# Patient Record
Sex: FEMALE | Race: BLACK OR AFRICAN AMERICAN | NOT HISPANIC OR LATINO | URBAN - METROPOLITAN AREA
[De-identification: names, ages, dates, MRNs, and addresses within clinical notes are randomized per-mention and may not be internally consistent; named-entity substitution may affect disease eponyms.]

---

## 2024-05-02 ENCOUNTER — OFFICE VISIT (OUTPATIENT)
Dept: URBAN - METROPOLITAN AREA CLINIC 70 | Facility: CLINIC | Age: 69
End: 2024-05-02
Payer: MEDICARE

## 2024-05-02 ENCOUNTER — LAB OUTSIDE AN ENCOUNTER (OUTPATIENT)
Dept: URBAN - METROPOLITAN AREA CLINIC 70 | Facility: CLINIC | Age: 69
End: 2024-05-02

## 2024-05-02 VITALS
WEIGHT: 209.8 LBS | TEMPERATURE: 97.3 F | DIASTOLIC BLOOD PRESSURE: 69 MMHG | BODY MASS INDEX: 32.93 KG/M2 | HEART RATE: 64 BPM | SYSTOLIC BLOOD PRESSURE: 103 MMHG | HEIGHT: 67 IN

## 2024-05-02 DIAGNOSIS — R79.89 ELEVATED LFTS: ICD-10-CM

## 2024-05-02 DIAGNOSIS — Z12.11 COLON CANCER SCREENING: ICD-10-CM

## 2024-05-02 DIAGNOSIS — K21.9 GASTROESOPHAGEAL REFLUX DISEASE, UNSPECIFIED WHETHER ESOPHAGITIS PRESENT: ICD-10-CM

## 2024-05-02 DIAGNOSIS — K59.04 CHRONIC IDIOPATHIC CONSTIPATION: ICD-10-CM

## 2024-05-02 PROBLEM — 82934008: Status: ACTIVE | Noted: 2024-05-02

## 2024-05-02 LAB — RAM: (no result)

## 2024-05-02 PROCEDURE — 99205 OFFICE O/P NEW HI 60 MIN: CPT | Performed by: REGISTERED NURSE

## 2024-05-02 RX ORDER — HYDROCODONE BITARTRATE AND ACETAMINOPHEN 10; 325 MG/1; MG/1
TAKE 1 TABLET BY ORAL ROUTE EVERY 4-6 HOURS AS NEEDED FOR PAIN TABLET ORAL
Qty: 0 | Refills: 0 | Status: ACTIVE | COMMUNITY
Start: 1900-01-01

## 2024-05-02 RX ORDER — AZELASTINE 137 UG/1
SPRAY 2 SPRAYS IN EACH NOSTRIL BY INTRANASAL ROUTE 2 TIMES PER DAY SPRAY, METERED NASAL 2
Qty: 1 | Refills: 0 | Status: DISCONTINUED | COMMUNITY
Start: 1900-01-01

## 2024-05-02 RX ORDER — ALBUTEROL SULFATE 90 UG/1
AEROSOL, METERED RESPIRATORY (INHALATION)
Qty: 18 GRAM | Status: ACTIVE | COMMUNITY

## 2024-05-02 RX ORDER — DEXTROSE 4 G
TAKE 1 TABLET (10 MG) BY ORAL ROUTE ONCE DAILY TABLET,CHEWABLE ORAL 1
Qty: 0 | Refills: 0 | Status: DISCONTINUED | COMMUNITY
Start: 1900-01-01

## 2024-05-02 RX ORDER — ATORVASTATIN CALCIUM 40 MG/1
TABLET, FILM COATED ORAL
Qty: 0 | Refills: 0 | Status: ACTIVE | COMMUNITY
Start: 1900-01-01

## 2024-05-02 RX ORDER — MONTELUKAST 10 MG/1
TAKE 1 TABLET BY MOUTH ONCE EVERY DAY TABLET, FILM COATED ORAL
Qty: 30 EACH | Refills: 1 | Status: ACTIVE | COMMUNITY

## 2024-05-02 RX ORDER — LEVOCETIRIZINE DIHYDROCHLORIDE 5 MG/1
TAKE 1 TABLET BY MOUTH ONCE EVERY DAY TABLET ORAL
Qty: 30 EACH | Refills: 0 | Status: ACTIVE | COMMUNITY

## 2024-05-02 RX ORDER — PANTOPRAZOLE SODIUM 40 MG/1
1 TABLET TABLET, DELAYED RELEASE ORAL ONCE A DAY
Qty: 90 TABLET | Refills: 3 | OUTPATIENT
Start: 2024-05-02

## 2024-05-02 RX ORDER — METOPROLOL TARTRATE 25 MG/1
TABLET, FILM COATED ORAL
Qty: 0 | Refills: 0 | Status: ACTIVE | COMMUNITY
Start: 1900-01-01

## 2024-05-02 RX ORDER — ALBUTEROL SULFATE 108 UG/1
INHALE 1 PUFF (90 MCG) BY INHALATION ROUTE EVERY 6 HOURS AS NEEDED AEROSOL, METERED RESPIRATORY (INHALATION)
Qty: 1 | Refills: 0 | Status: DISCONTINUED | COMMUNITY
Start: 1900-01-01

## 2024-05-02 RX ORDER — FUROSEMIDE 40 MG/1
TABLET ORAL
Qty: 0 | Refills: 0 | Status: DISCONTINUED | COMMUNITY
Start: 1900-01-01

## 2024-05-02 RX ORDER — POTASSIUM CHLORIDE 750 MG/1
TABLET, EXTENDED RELEASE ORAL
Qty: 0 | Refills: 0 | Status: DISCONTINUED | COMMUNITY
Start: 1900-01-01

## 2024-05-03 ENCOUNTER — LAB OUTSIDE AN ENCOUNTER (OUTPATIENT)
Dept: URBAN - METROPOLITAN AREA CLINIC 70 | Facility: CLINIC | Age: 69
End: 2024-05-03

## 2024-05-09 LAB
ACTIN (SMOOTH MUSCLE) ANTIBODY (IGG): 33
AFP, SERUM, TUMOR MARKER: 16.3
ALBUMIN/GLOBULIN RATIO: 1
ALBUMIN: 3.9
ALKALINE PHOSPHATASE: 233
ALPHA-1-ANTITRYPSIN QN: 214
ALT (SGPT): 392
ANA SCREEN, IFA: POSITIVE
AST (SGOT): 713
BILIRUBIN, DIRECT: 1.8
BILIRUBIN, INDIRECT: 2.4
BILIRUBIN, TOTAL: 4.2
CERULOPLASMIN: 50
FERRITIN, SERUM: 947
GLOBULIN: 4.1
HBSAG SCREEN: (no result)
HEMATOCRIT: 40.4
HEMOGLOBIN: 13.8
HEP A AB, IGM: (no result)
HEP B CORE AB, IGM: (no result)
HEPATITIS C ANTIBODY: (no result)
INR: 1
INTERPRETATION: (no result)
MCH: 30.2
MCHC: 34.2
MCV: 88.4
MITOCHONDRIAL (M2) ANTIBODY: <=20
MPV: 10.7
PLATELET COUNT: 303
PROTEIN, TOTAL: 8
PT: 11
RDW: 16.4
RED BLOOD CELL COUNT: 4.57
RHEUMATOID FACTOR: <10
SJOGREN'S ANTIBODY (SS-A): (no result)
SJOGREN'S ANTIBODY (SS-B): (no result)
WHITE BLOOD CELL COUNT: 6

## 2024-05-19 ENCOUNTER — LAB OUTSIDE AN ENCOUNTER (OUTPATIENT)
Dept: URBAN - METROPOLITAN AREA CLINIC 70 | Facility: CLINIC | Age: 69
End: 2024-05-19

## 2024-05-19 ENCOUNTER — TELEPHONE ENCOUNTER (OUTPATIENT)
Dept: URBAN - METROPOLITAN AREA CLINIC 70 | Facility: CLINIC | Age: 69
End: 2024-05-19

## 2024-05-21 ENCOUNTER — CLAIMS CREATED FROM THE CLAIM WINDOW (OUTPATIENT)
Dept: URBAN - METROPOLITAN AREA CLINIC 4 | Facility: CLINIC | Age: 69
End: 2024-05-21
Payer: MEDICARE

## 2024-05-21 ENCOUNTER — OFFICE VISIT (OUTPATIENT)
Dept: URBAN - METROPOLITAN AREA SURGERY CENTER 24 | Facility: SURGERY CENTER | Age: 69
End: 2024-05-21
Payer: MEDICARE

## 2024-05-21 DIAGNOSIS — K63.89 OTHER SPECIFIED DISEASES OF INTESTINE: ICD-10-CM

## 2024-05-21 DIAGNOSIS — K63.5 BENIGN COLON POLYP: ICD-10-CM

## 2024-05-21 DIAGNOSIS — Z12.11 COLON CANCER SCREENING: ICD-10-CM

## 2024-05-21 DIAGNOSIS — K63.5 COLON POLYPS: ICD-10-CM

## 2024-05-21 PROCEDURE — G8907 PT DOC NO EVENTS ON DISCHARG: HCPCS | Performed by: CLINIC/CENTER

## 2024-05-21 PROCEDURE — 00811 ANES LWR INTST NDSC NOS: CPT | Performed by: NURSE ANESTHETIST, CERTIFIED REGISTERED

## 2024-05-21 PROCEDURE — 88305 TISSUE EXAM BY PATHOLOGIST: CPT | Performed by: PATHOLOGY

## 2024-05-21 PROCEDURE — 45385 COLONOSCOPY W/LESION REMOVAL: CPT | Performed by: CLINIC/CENTER

## 2024-05-21 PROCEDURE — 45385 COLONOSCOPY W/LESION REMOVAL: CPT | Performed by: INTERNAL MEDICINE

## 2024-05-21 PROCEDURE — 88302 TISSUE EXAM BY PATHOLOGIST: CPT | Performed by: PATHOLOGY

## 2024-05-21 RX ORDER — ATORVASTATIN CALCIUM 40 MG/1
TABLET, FILM COATED ORAL
Qty: 0 | Refills: 0 | Status: ACTIVE | COMMUNITY
Start: 1900-01-01

## 2024-05-21 RX ORDER — PANTOPRAZOLE SODIUM 40 MG/1
1 TABLET TABLET, DELAYED RELEASE ORAL ONCE A DAY
Qty: 90 TABLET | Refills: 3 | Status: ACTIVE | COMMUNITY
Start: 2024-05-02

## 2024-05-21 RX ORDER — HYDROCODONE BITARTRATE AND ACETAMINOPHEN 10; 325 MG/1; MG/1
TAKE 1 TABLET BY ORAL ROUTE EVERY 4-6 HOURS AS NEEDED FOR PAIN TABLET ORAL
Qty: 0 | Refills: 0 | Status: ACTIVE | COMMUNITY
Start: 1900-01-01

## 2024-05-21 RX ORDER — MONTELUKAST 10 MG/1
TAKE 1 TABLET BY MOUTH ONCE EVERY DAY TABLET, FILM COATED ORAL
Qty: 30 EACH | Refills: 1 | Status: ACTIVE | COMMUNITY

## 2024-05-21 RX ORDER — METOPROLOL TARTRATE 25 MG/1
TABLET, FILM COATED ORAL
Qty: 0 | Refills: 0 | Status: ACTIVE | COMMUNITY
Start: 1900-01-01

## 2024-05-21 RX ORDER — ALBUTEROL SULFATE 90 UG/1
AEROSOL, METERED RESPIRATORY (INHALATION)
Qty: 18 GRAM | Status: ACTIVE | COMMUNITY

## 2024-05-21 RX ORDER — LEVOCETIRIZINE DIHYDROCHLORIDE 5 MG/1
TAKE 1 TABLET BY MOUTH ONCE EVERY DAY TABLET ORAL
Qty: 30 EACH | Refills: 0 | Status: ACTIVE | COMMUNITY

## 2024-05-29 ENCOUNTER — OFFICE VISIT (OUTPATIENT)
Dept: URBAN - METROPOLITAN AREA CLINIC 70 | Facility: CLINIC | Age: 69
End: 2024-05-29
Payer: MEDICARE

## 2024-05-29 ENCOUNTER — DASHBOARD ENCOUNTERS (OUTPATIENT)
Age: 69
End: 2024-05-29

## 2024-05-29 VITALS
BODY MASS INDEX: 33.59 KG/M2 | SYSTOLIC BLOOD PRESSURE: 105 MMHG | HEART RATE: 59 BPM | TEMPERATURE: 97.5 F | DIASTOLIC BLOOD PRESSURE: 69 MMHG | WEIGHT: 214 LBS | HEIGHT: 67 IN

## 2024-05-29 DIAGNOSIS — R74.8 ELEVATED LIVER ENZYMES: ICD-10-CM

## 2024-05-29 DIAGNOSIS — R76.0 ABNORMAL ANTIBODY TITER: ICD-10-CM

## 2024-05-29 DIAGNOSIS — K21.9 GASTROESOPHAGEAL REFLUX DISEASE, UNSPECIFIED WHETHER ESOPHAGITIS PRESENT: ICD-10-CM

## 2024-05-29 DIAGNOSIS — K59.04 CHRONIC IDIOPATHIC CONSTIPATION: ICD-10-CM

## 2024-05-29 PROCEDURE — 99214 OFFICE O/P EST MOD 30 MIN: CPT | Performed by: REGISTERED NURSE

## 2024-05-29 RX ORDER — ALBUTEROL SULFATE 90 UG/1
AEROSOL, METERED RESPIRATORY (INHALATION)
Qty: 18 GRAM | Status: ACTIVE | COMMUNITY

## 2024-05-29 RX ORDER — PANTOPRAZOLE SODIUM 40 MG/1
1 TABLET TABLET, DELAYED RELEASE ORAL ONCE A DAY
Qty: 90 TABLET | Refills: 3 | Status: ACTIVE | COMMUNITY
Start: 2024-05-02

## 2024-05-29 RX ORDER — HYDROCODONE BITARTRATE AND ACETAMINOPHEN 10; 325 MG/1; MG/1
TAKE 1 TABLET BY ORAL ROUTE EVERY 4-6 HOURS AS NEEDED FOR PAIN TABLET ORAL
Qty: 0 | Refills: 0 | Status: ACTIVE | COMMUNITY
Start: 1900-01-01

## 2024-05-29 RX ORDER — PANTOPRAZOLE SODIUM 40 MG/1
1 TABLET TABLET, DELAYED RELEASE ORAL ONCE A DAY
Qty: 90 TABLET | Refills: 3 | OUTPATIENT

## 2024-05-29 RX ORDER — ATORVASTATIN CALCIUM 40 MG/1
TABLET, FILM COATED ORAL
Qty: 0 | Refills: 0 | Status: ACTIVE | COMMUNITY
Start: 1900-01-01

## 2024-05-29 RX ORDER — LEVOCETIRIZINE DIHYDROCHLORIDE 5 MG/1
TAKE 1 TABLET BY MOUTH ONCE EVERY DAY TABLET ORAL
Qty: 30 EACH | Refills: 0 | Status: ACTIVE | COMMUNITY

## 2024-05-29 RX ORDER — MONTELUKAST 10 MG/1
TAKE 1 TABLET BY MOUTH ONCE EVERY DAY TABLET, FILM COATED ORAL
Qty: 30 EACH | Refills: 1 | Status: ACTIVE | COMMUNITY

## 2024-05-29 RX ORDER — METOPROLOL TARTRATE 25 MG/1
TABLET, FILM COATED ORAL
Qty: 0 | Refills: 0 | Status: ACTIVE | COMMUNITY
Start: 1900-01-01

## 2024-07-01 ENCOUNTER — OFFICE VISIT (OUTPATIENT)
Dept: URBAN - METROPOLITAN AREA CLINIC 70 | Facility: CLINIC | Age: 69
End: 2024-07-01
Payer: MEDICARE

## 2024-07-01 VITALS
BODY MASS INDEX: 34.44 KG/M2 | DIASTOLIC BLOOD PRESSURE: 79 MMHG | HEART RATE: 63 BPM | WEIGHT: 219.4 LBS | TEMPERATURE: 97.3 F | SYSTOLIC BLOOD PRESSURE: 136 MMHG | HEIGHT: 67 IN

## 2024-07-01 DIAGNOSIS — K59.04 CHRONIC IDIOPATHIC CONSTIPATION: ICD-10-CM

## 2024-07-01 DIAGNOSIS — K75.4 AUTOIMMUNE HEPATITIS: ICD-10-CM

## 2024-07-01 DIAGNOSIS — K21.9 ACID REFLUX: ICD-10-CM

## 2024-07-01 DIAGNOSIS — R74.8 ELEVATED LIVER ENZYMES: ICD-10-CM

## 2024-07-01 PROBLEM — 408335007: Status: ACTIVE | Noted: 2024-07-01

## 2024-07-01 PROCEDURE — 99214 OFFICE O/P EST MOD 30 MIN: CPT | Performed by: REGISTERED NURSE

## 2024-07-01 RX ORDER — MONTELUKAST 10 MG/1
TAKE 1 TABLET BY MOUTH ONCE EVERY DAY TABLET, FILM COATED ORAL
Qty: 30 EACH | Refills: 1 | Status: ACTIVE | COMMUNITY

## 2024-07-01 RX ORDER — METOPROLOL TARTRATE 25 MG/1
TABLET, FILM COATED ORAL
Qty: 0 | Refills: 0 | Status: ACTIVE | COMMUNITY
Start: 1900-01-01

## 2024-07-01 RX ORDER — HYDROCODONE BITARTRATE AND ACETAMINOPHEN 10; 325 MG/1; MG/1
TAKE 1 TABLET BY ORAL ROUTE EVERY 4-6 HOURS AS NEEDED FOR PAIN TABLET ORAL
Qty: 0 | Refills: 0 | Status: ACTIVE | COMMUNITY
Start: 1900-01-01

## 2024-07-01 RX ORDER — PANTOPRAZOLE SODIUM 40 MG/1
1 TABLET TABLET, DELAYED RELEASE ORAL ONCE A DAY
Qty: 90 TABLET | Refills: 3 | OUTPATIENT

## 2024-07-01 RX ORDER — ATORVASTATIN CALCIUM 40 MG/1
TABLET, FILM COATED ORAL
Qty: 0 | Refills: 0 | Status: ACTIVE | COMMUNITY
Start: 1900-01-01

## 2024-07-01 RX ORDER — ALBUTEROL SULFATE 90 UG/1
AEROSOL, METERED RESPIRATORY (INHALATION)
Qty: 18 GRAM | Status: ACTIVE | COMMUNITY

## 2024-07-01 RX ORDER — PANTOPRAZOLE SODIUM 40 MG/1
1 TABLET TABLET, DELAYED RELEASE ORAL ONCE A DAY
Qty: 90 TABLET | Refills: 3 | Status: ACTIVE | COMMUNITY

## 2024-07-01 RX ORDER — LEVOCETIRIZINE DIHYDROCHLORIDE 5 MG/1
TAKE 1 TABLET BY MOUTH ONCE EVERY DAY TABLET ORAL
Qty: 30 EACH | Refills: 0 | Status: ACTIVE | COMMUNITY

## 2024-07-01 NOTE — HPI-TODAY'S VISIT:
Liver biopsy was done. Pathology shows chronic hepatitis with moderate portal lymphoplasmacytic infiltrate, mild interface hepatitis(grade 2) and moderate periportal fibrosis(stage 2). Pt presents today with no GI complaints.

## 2024-07-01 NOTE — HPI-OTHER HISTORIES
Note from OV 5/2/24: Pt presents for evaluation of elevated LFTs. Labs on 4/24/24 show Total Bilirubin 7.1, alkaline phosphatase 351, , and AST 1294. Pt has a hx of drug and alcohol use but has not had either one since 2004. She denies any abdominal pain. Additional testing includes a CT scan on 4/25/24 shows an enlarged lobulated liver suggestive of cirrhosis. She admits to yellowing of the eyes and dark urine over the past few weeks. No prior diagnosis of liver disease. She is not aware of having any LFT elevation in the past.  Pt c/o constipation. Constipation has been present for several years. Bowels move every 2-3 days. Associated symptoms include gas and bloating. Pt c/o GERD. Symptoms include frequent episodes of heartburn and indigestion. Symptoms have been present for several years but has progressively worsened. She is not taking a PPI or H2 blocker. EGD 5/19/16 was normal.  Pt also due for colon cancer screening. Last colonoscopy was done 5/14/12 with finding of diverticulosis and hemorrhoids. No family history of colon cancer. Pt denies any abdominal pain, diarrhea, rectal bleeding or weight loss. ------------------------------------------------ Note from OV 5/29/24: Bowels are moving every day with Miralax. GERD symptoms have improved. She has occasional heartburn if she eats something greasy or spicy. Labs showed positive BONNIE and elevated smooth muscle antibody. LFTs are trending down. Jaundice is improving. She was taking Norco but this has been changed to oxycodone without acetaminophen. Liver biopsy has already been ordered and is scheduled for 6/18/24. -----------------------------------------------------

## 2024-08-09 ENCOUNTER — LAB OUTSIDE AN ENCOUNTER (OUTPATIENT)
Dept: URBAN - METROPOLITAN AREA CLINIC 86 | Facility: CLINIC | Age: 69
End: 2024-08-09

## 2024-08-09 ENCOUNTER — OFFICE VISIT (OUTPATIENT)
Dept: URBAN - METROPOLITAN AREA CLINIC 86 | Facility: CLINIC | Age: 69
End: 2024-08-09
Payer: MEDICARE

## 2024-08-09 VITALS
SYSTOLIC BLOOD PRESSURE: 114 MMHG | DIASTOLIC BLOOD PRESSURE: 71 MMHG | TEMPERATURE: 98.8 F | BODY MASS INDEX: 34.92 KG/M2 | HEIGHT: 67 IN | HEART RATE: 60 BPM | WEIGHT: 222.5 LBS

## 2024-08-09 DIAGNOSIS — R74.8 ELEVATED LIVER ENZYMES: ICD-10-CM

## 2024-08-09 DIAGNOSIS — R76.9 ABNORMAL IMMUNOLOGICAL FINDING IN SERUM: ICD-10-CM

## 2024-08-09 DIAGNOSIS — R77.2 ELEVATED AFP: ICD-10-CM

## 2024-08-09 PROBLEM — 416940007: Status: ACTIVE | Noted: 2024-08-09

## 2024-08-09 PROBLEM — 151271000119102: Status: ACTIVE | Noted: 2024-08-09

## 2024-08-09 PROBLEM — 166561008: Status: ACTIVE | Noted: 2024-08-09

## 2024-08-09 PROBLEM — 707724006: Status: ACTIVE | Noted: 2024-08-09

## 2024-08-09 PROCEDURE — 99214 OFFICE O/P EST MOD 30 MIN: CPT | Performed by: PHYSICIAN ASSISTANT

## 2024-08-09 RX ORDER — ALBUTEROL SULFATE 90 UG/1
AEROSOL, METERED RESPIRATORY (INHALATION)
Qty: 18 GRAM | Status: ACTIVE | COMMUNITY

## 2024-08-09 RX ORDER — MONTELUKAST 10 MG/1
TAKE 1 TABLET BY MOUTH ONCE EVERY DAY TABLET, FILM COATED ORAL
Qty: 30 EACH | Refills: 1 | Status: ACTIVE | COMMUNITY

## 2024-08-09 RX ORDER — LEVOCETIRIZINE DIHYDROCHLORIDE 5 MG/1
TAKE 1 TABLET BY MOUTH ONCE EVERY DAY TABLET ORAL
Qty: 30 EACH | Refills: 0 | Status: ACTIVE | COMMUNITY

## 2024-08-09 RX ORDER — PANTOPRAZOLE SODIUM 40 MG/1
1 TABLET TABLET, DELAYED RELEASE ORAL ONCE A DAY
Qty: 90 TABLET | Refills: 3 | Status: ACTIVE | COMMUNITY

## 2024-08-09 RX ORDER — ATORVASTATIN CALCIUM 40 MG/1
TABLET, FILM COATED ORAL
Qty: 0 | Refills: 0 | Status: ON HOLD | COMMUNITY
Start: 1900-01-01

## 2024-08-09 RX ORDER — HYDROCODONE BITARTRATE AND ACETAMINOPHEN 10; 325 MG/1; MG/1
TAKE 1 TABLET BY ORAL ROUTE EVERY 4-6 HOURS AS NEEDED FOR PAIN TABLET ORAL
Qty: 0 | Refills: 0 | Status: ON HOLD | COMMUNITY
Start: 1900-01-01

## 2024-08-09 RX ORDER — METOPROLOL TARTRATE 25 MG/1
TABLET, FILM COATED ORAL
Qty: 0 | Refills: 0 | Status: ACTIVE | COMMUNITY
Start: 1900-01-01

## 2024-08-09 NOTE — HPI-TODAY'S VISIT:
This is a 70 yo female , referred by VONDA Dodge, for evaluation of possible AIH. A copy of the note will be sent to the referring provider.    Enrico had a CT scan in April suggesting cirrhosis and she presented with some jaundice at her first visit.  She had elevated liver enzymes in April 24 labs with bilirubin 7.1, alkaline phosphatase 351, AST 1294 and .  She had a history of drug and etohbut none since 2004.  She had a positive BONNIE and ASMA.  Her most recent labs on May 2 were sent to me and at that time bilirubin 4.2, alkaline phosphatase 233,  and .  She had a liver biopsy done and it showed chronic hepatitis with moderate portal lymph of plasmacytic infiltrate and mild interface hepatitis (grade 2) and moderate periportal fibrosis (stage II).  The pathologist comments are overall findings are nonspecific and that the lymphoplasmacytic and and portal infiltrate may be consistent with autoimmune hepatitis in the proper clinical context and recommended correlating with labs.  This biopsy was done at [facility].  Asked about triggers and unclear. Denied any med changes/illness/vitamins/supplements.   Dr. Hans Morocho is her PCP and did her labs regularly and they are normal.   ETOH quit in 2004, was not a heavy drinker.   Denies fh of autoimmune disease, liver disease.

## 2024-08-12 LAB
A/G RATIO: 1.1
ABSOLUTE BASOPHILS: 19
ABSOLUTE EOSINOPHILS: 57
ABSOLUTE LYMPHOCYTES: 3049
ABSOLUTE MONOCYTES: 416
ABSOLUTE NEUTROPHILS: 2759
AFP, SERUM, TUMOR MARKER: 5.4
ALBUMIN: 4.1
ALKALINE PHOSPHATASE: 74
ALT (SGPT): 11
AST (SGOT): 14
BASOPHILS: 0.3
BILIRUBIN, TOTAL: 0.8
BUN/CREATININE RATIO: (no result)
BUN: 11
CALCIUM: 9.6
CARBON DIOXIDE, TOTAL: 26
CHLORIDE: 102
CREATININE: 0.71
EGFR: 93
EOSINOPHILS: 0.9
GLOBULIN, TOTAL: 3.7
GLUCOSE: 76
HEMATOCRIT: 46.3
HEMOGLOBIN: 15.6
HEPATITIS A AB, TOTAL: REACTIVE
HEPATITIS B SURFACE AB IMMUNITY, QN: 416
IMMUNOGLOBULIN A: 384
IMMUNOGLOBULIN G: 1739
IMMUNOGLOBULIN M: 151
INR: 1.1
LYMPHOCYTES: 48.4
MCH: 31.6
MCHC: 33.7
MCV: 93.7
MONOCYTES: 6.6
MPV: 10.4
NEUTROPHILS: 43.8
PLATELET COUNT: 262
POTASSIUM: 4.2
PROTEIN, TOTAL: 7.8
PT: 11.1
RDW: 12.2
RED BLOOD CELL COUNT: 4.94
SODIUM: 140
WHITE BLOOD CELL COUNT: 6.3

## 2024-08-13 ENCOUNTER — TELEPHONE ENCOUNTER (OUTPATIENT)
Dept: URBAN - METROPOLITAN AREA CLINIC 86 | Facility: CLINIC | Age: 69
End: 2024-08-13

## 2024-08-13 NOTE — HPI-TODAY'S VISIT:
Dear Florence Mayorga,   The 8/9/24 labs were sent to me.  The IgM 151, immunoglobulin G 1739.  The immunoglobulin G slightly elevated.  aFP normal at 5.4, you have immunity to hepatitis B.  The bilirubin normal at 0.8, alkaline phosphatase 74, AST 14 and ALT 11.  The white blood cell 6.3, hemoglobin 15.6, MCV 93 and platelets 262.  INR 1.1.  You have immunity to hepatitis A.  May 2 were sent to me and at that time bilirubin 4.2, alkaline phosphatase 233,  and . I am happy to see the labs are lower and given this lets see what your ultrasound shows and then discuss best next steps for you.   Jeannie Ramey PA-C

## 2024-08-23 ENCOUNTER — LAB OUTSIDE AN ENCOUNTER (OUTPATIENT)
Dept: URBAN - METROPOLITAN AREA CLINIC 86 | Facility: CLINIC | Age: 69
End: 2024-08-23

## 2024-08-26 LAB
A/G RATIO: 1.2
ABSOLUTE BASOPHILS: 32
ABSOLUTE EOSINOPHILS: 97
ABSOLUTE LYMPHOCYTES: 2727
ABSOLUTE MONOCYTES: 378
ABSOLUTE NEUTROPHILS: 2165
ALBUMIN: 4
ALKALINE PHOSPHATASE: 74
ALT (SGPT): 11
AST (SGOT): 15
BASOPHILS: 0.6
BILIRUBIN, TOTAL: 0.5
BUN/CREATININE RATIO: (no result)
BUN: 12
CALCIUM: 9.6
CARBON DIOXIDE, TOTAL: 30
CHLORIDE: 100
CREATININE: 0.79
EGFR: 82
EOSINOPHILS: 1.8
GLOBULIN, TOTAL: 3.3
GLUCOSE: 91
HEMATOCRIT: 48.4
HEMOGLOBIN: 15.9
IMMUNOGLOBULIN A: 379
IMMUNOGLOBULIN G: 1619
IMMUNOGLOBULIN M: 144
LYMPHOCYTES: 50.5
MCH: 31.2
MCHC: 32.9
MCV: 94.9
MONOCYTES: 7
MPV: 10.2
NEUTROPHILS: 40.1
PLATELET COUNT: 284
POTASSIUM: 4.8
PROTEIN, TOTAL: 7.3
RDW: 12.2
RED BLOOD CELL COUNT: 5.1
SODIUM: 139
WHITE BLOOD CELL COUNT: 5.4

## 2024-08-27 ENCOUNTER — TELEPHONE ENCOUNTER (OUTPATIENT)
Dept: URBAN - METROPOLITAN AREA CLINIC 86 | Facility: CLINIC | Age: 69
End: 2024-08-27

## 2024-08-27 NOTE — HPI-TODAY'S VISIT:
Dearmarisela Mayorga,  The 8/23/24 labs were sent to me.  The white blood cells 5.4, red blood cells 5.1, hemoglobin 15.9, MCV 94 and platelets 284.  The hemoglobin slightly elevated at 15.9.  Upper limits of normal at 15.5 send is only slightly and I would just recommend sharing that with her primary care.  Creatinine 0.79, sodium 139, potassium 4.8, bilirubin 0.5, alkaline phosphatase 74, AST 15 and ALT 11.  Immunoglobulin G is elevated at 1619 and IgM is 144.  This is lower compared to previously the immunoglobulin G was 1739.  Happy to see this is lower.  The IgM remains normal. Jeannie Ramey PA-C

## 2024-08-30 ENCOUNTER — OFFICE VISIT (OUTPATIENT)
Dept: URBAN - METROPOLITAN AREA TELEHEALTH 2 | Facility: TELEHEALTH | Age: 69
End: 2024-08-30
Payer: MEDICARE

## 2024-08-30 DIAGNOSIS — R77.2 ELEVATED AFP: ICD-10-CM

## 2024-08-30 DIAGNOSIS — R74.8 ELEVATED LIVER ENZYMES: ICD-10-CM

## 2024-08-30 DIAGNOSIS — R76.9 ABNORMAL IMMUNOLOGICAL FINDING IN SERUM: ICD-10-CM

## 2024-08-30 DIAGNOSIS — Z98.890 HISTORY OF LIVER BIOPSY: ICD-10-CM

## 2024-08-30 DIAGNOSIS — K75.4 AUTOIMMUNE HEPATITIS: ICD-10-CM

## 2024-08-30 PROCEDURE — 99214 OFFICE O/P EST MOD 30 MIN: CPT | Performed by: PHYSICIAN ASSISTANT

## 2024-08-30 RX ORDER — PANTOPRAZOLE SODIUM 40 MG/1
1 TABLET TABLET, DELAYED RELEASE ORAL ONCE A DAY
Qty: 90 TABLET | Refills: 3 | Status: ACTIVE | COMMUNITY

## 2024-08-30 RX ORDER — HYDROCODONE BITARTRATE AND ACETAMINOPHEN 10; 325 MG/1; MG/1
TAKE 1 TABLET BY ORAL ROUTE EVERY 4-6 HOURS AS NEEDED FOR PAIN TABLET ORAL
Qty: 0 | Refills: 0 | Status: ON HOLD | COMMUNITY
Start: 1900-01-01

## 2024-08-30 RX ORDER — MONTELUKAST 10 MG/1
TAKE 1 TABLET BY MOUTH ONCE EVERY DAY TABLET, FILM COATED ORAL
Qty: 30 EACH | Refills: 1 | Status: ACTIVE | COMMUNITY

## 2024-08-30 RX ORDER — ATORVASTATIN CALCIUM 40 MG/1
TABLET, FILM COATED ORAL
Qty: 0 | Refills: 0 | Status: ON HOLD | COMMUNITY
Start: 1900-01-01

## 2024-08-30 RX ORDER — ALBUTEROL SULFATE 90 UG/1
AEROSOL, METERED RESPIRATORY (INHALATION)
Qty: 18 GRAM | Status: ACTIVE | COMMUNITY

## 2024-08-30 RX ORDER — URSODIOL 300 MG/1
1 CAPSULE CAPSULE ORAL TWICE A DAY
Qty: 60 CAPSULE | Refills: 2 | OUTPATIENT
Start: 2024-08-30

## 2024-08-30 RX ORDER — LEVOCETIRIZINE DIHYDROCHLORIDE 5 MG/1
TAKE 1 TABLET BY MOUTH ONCE EVERY DAY TABLET ORAL
Qty: 30 EACH | Refills: 0 | Status: ACTIVE | COMMUNITY

## 2024-08-30 RX ORDER — METOPROLOL TARTRATE 25 MG/1
TABLET, FILM COATED ORAL
Qty: 0 | Refills: 0 | Status: ACTIVE | COMMUNITY
Start: 1900-01-01

## 2024-08-30 NOTE — HPI-TODAY'S VISIT:
This is a 68 yo female , referred by VONDA Dodge, for evaluation of possible AIH. A copy of the note will be sent to the referring provider.   8/30/24   The 8/23/24 labs were sent to me. The white blood cells 5.4, red blood cells 5.1, hemoglobin 15.9, MCV 94 and platelets 284. The hemoglobin slightly elevated at 15.9. Upper limits of normal at 15.5 send is only slightly and I would just recommend sharing that with her primary care. Creatinine 0.79, sodium 139, potassium 4.8, bilirubin 0.5, alkaline phosphatase 74, AST 15 and ALT 11. Immunoglobulin G is elevated at 1619 and IgM is 144. This is lower compared to previously the immunoglobulin G was 1739. Happy to see this is lower. The IgM remains normal.   The 8/9/24 labs were sent to me.  The IgM 151, immunoglobulin G 1739.  The immunoglobulin G slightly elevated.  aFP normal at 5.4, you have immunity to hepatitis B.  The bilirubin normal at 0.8, alkaline phosphatase 74, AST 14 and ALT 11.  The white blood cell 6.3, hemoglobin 15.6, MCV 93 and platelets 262.  INR 1.1.  You have immunity to hepatitis A.  May 2 were sent to me and at that time bilirubin 4.2, alkaline phosphatase 233,  and . I am happy to see the labs are lower and given this lets see what your ultrasound shows and then discuss best next steps for you.   discussed the above and can try ursodiol  has scan on the 11th .    ness Weston had a CT scan in April suggesting cirrhosis and she presented with some jaundice at her first visit.  She had elevated liver enzymes in April 24 labs with bilirubin 7.1, alkaline phosphatase 351, AST 1294 and .  She had a history of drug and etohbut none since 2004.  She had a positive BONNIE and ASMA.  Her most recent labs on May 2 were sent to me and at that time bilirubin 4.2, alkaline phosphatase 233,  and .  She had a liver biopsy done and it showed chronic hepatitis with moderate portal lymph of plasmacytic infiltrate and mild interface hepatitis (grade 2) and moderate periportal fibrosis (stage II).  The pathologist comments are overall findings are nonspecific and that the lymphoplasmacytic and and portal infiltrate may be consistent with autoimmune hepatitis in the proper clinical context and recommended correlating with labs.  This biopsy was done at [facility].  Asked about triggers and unclear. Denied any med changes/illness/vitamins/supplements.   Dr. Hans Morocho is her PCP and did her labs regularly and they are normal.   ETOH quit in 2004, was not a heavy drinker.   Denies fh of autoimmune disease, liver disease.

## 2024-09-27 ENCOUNTER — LAB OUTSIDE AN ENCOUNTER (OUTPATIENT)
Dept: URBAN - METROPOLITAN AREA TELEHEALTH 2 | Facility: TELEHEALTH | Age: 69
End: 2024-09-27

## 2024-10-08 ENCOUNTER — TELEPHONE ENCOUNTER (OUTPATIENT)
Dept: URBAN - METROPOLITAN AREA CLINIC 86 | Facility: CLINIC | Age: 69
End: 2024-10-08

## 2024-10-08 NOTE — HPI-TODAY'S VISIT:
Dear Florence Mayorga,   The recent labs were sent to me.  The bilirubin 0.7, alkaline phosphatase 76, AST 14, ALT 9.  The immunoglobulin G and IgM were normal.  The IgA is slightly elevated at 369 and this is nonspecific and I would just recommend sharing that with your primary care.  We will review this at the follow-up.  Jeannie Ramey PA-C

## 2024-10-21 ENCOUNTER — LAB OUTSIDE AN ENCOUNTER (OUTPATIENT)
Dept: URBAN - METROPOLITAN AREA TELEHEALTH 2 | Facility: TELEHEALTH | Age: 69
End: 2024-10-21

## 2024-10-22 LAB
ALBUMIN/GLOBULIN RATIO: 1.2
ALBUMIN: 3.8
ALKALINE PHOSPHATASE: 72
ALT (SGPT): 8
AST (SGOT): 13
BILIRUBIN, DIRECT: 0.1
BILIRUBIN, INDIRECT: 0.4
BILIRUBIN, TOTAL: 0.5
GLOBULIN: 3.2
IMMUNOGLOBULIN A: 376
IMMUNOGLOBULIN G: 1426
IMMUNOGLOBULIN M: 167
PROTEIN, TOTAL: 7

## 2024-10-28 ENCOUNTER — TELEPHONE ENCOUNTER (OUTPATIENT)
Dept: URBAN - METROPOLITAN AREA CLINIC 86 | Facility: CLINIC | Age: 69
End: 2024-10-28

## 2024-10-28 NOTE — HPI-TODAY'S VISIT:
Florence Mayorga, The recent labs were sent to me and the bilirubin 0.5, alkaline phosphatase 72, AST 13 and ALT 8.  The immunoglobulins were normal.  We will review this at your follow-up and see how you are doing. Leila Ramey PA-C

## 2024-10-29 ENCOUNTER — OFFICE VISIT (OUTPATIENT)
Dept: URBAN - METROPOLITAN AREA TELEHEALTH 2 | Facility: TELEHEALTH | Age: 69
End: 2024-10-29
Payer: MEDICARE

## 2024-10-29 VITALS — BODY MASS INDEX: 19.3 KG/M2 | HEIGHT: 67 IN | WEIGHT: 123 LBS

## 2024-10-29 DIAGNOSIS — Z98.890 HISTORY OF LIVER BIOPSY: ICD-10-CM

## 2024-10-29 DIAGNOSIS — R76.9 ABNORMAL IMMUNOLOGICAL FINDING IN SERUM: ICD-10-CM

## 2024-10-29 DIAGNOSIS — R74.8 ELEVATED LIVER ENZYMES: ICD-10-CM

## 2024-10-29 DIAGNOSIS — K75.4 AUTOIMMUNE HEPATITIS: ICD-10-CM

## 2024-10-29 DIAGNOSIS — R77.2 ELEVATED AFP: ICD-10-CM

## 2024-10-29 PROCEDURE — 99214 OFFICE O/P EST MOD 30 MIN: CPT | Performed by: PHYSICIAN ASSISTANT

## 2024-10-29 RX ORDER — URSODIOL 300 MG/1
1 CAPSULE CAPSULE ORAL TWICE A DAY
Qty: 60 CAPSULE | Refills: 2 | Status: ACTIVE | COMMUNITY
Start: 2024-08-30

## 2024-10-29 RX ORDER — ALBUTEROL SULFATE 90 UG/1
AEROSOL, METERED RESPIRATORY (INHALATION)
Qty: 18 GRAM | Status: ACTIVE | COMMUNITY

## 2024-10-29 RX ORDER — METOPROLOL TARTRATE 25 MG/1
TABLET, FILM COATED ORAL
Qty: 0 | Refills: 0 | Status: ACTIVE | COMMUNITY
Start: 1900-01-01

## 2024-10-29 RX ORDER — PANTOPRAZOLE SODIUM 40 MG/1
1 TABLET TABLET, DELAYED RELEASE ORAL ONCE A DAY
Qty: 90 TABLET | Refills: 3 | Status: ACTIVE | COMMUNITY

## 2024-10-29 RX ORDER — ATORVASTATIN CALCIUM 40 MG/1
TABLET, FILM COATED ORAL
Qty: 0 | Refills: 0 | COMMUNITY
Start: 1900-01-01

## 2024-10-29 RX ORDER — MONTELUKAST 10 MG/1
TAKE 1 TABLET BY MOUTH ONCE EVERY DAY TABLET, FILM COATED ORAL
Qty: 30 EACH | Refills: 1 | Status: ACTIVE | COMMUNITY

## 2024-10-29 RX ORDER — LEVOCETIRIZINE DIHYDROCHLORIDE 5 MG/1
TAKE 1 TABLET BY MOUTH ONCE EVERY DAY TABLET ORAL
Qty: 30 EACH | Refills: 0 | Status: ACTIVE | COMMUNITY

## 2024-10-29 RX ORDER — URSODIOL 300 MG/1
1 CAPSULE CAPSULE ORAL TWICE A DAY
Qty: 60 CAPSULE | Refills: 2 | OUTPATIENT

## 2024-10-29 RX ORDER — HYDROCODONE BITARTRATE AND ACETAMINOPHEN 10; 325 MG/1; MG/1
TAKE 1 TABLET BY ORAL ROUTE EVERY 4-6 HOURS AS NEEDED FOR PAIN TABLET ORAL
Qty: 0 | Refills: 0 | COMMUNITY
Start: 1900-01-01

## 2024-10-29 NOTE — HPI-TODAY'S VISIT:
This is a 68 yo female , referred by VONDA Dodge, for evaluation of possible AIH. A copy of the note will be sent to the referring provider.   10/29/24 Florence Mayorga,  The recent labs were sent to me and the bilirubin 0.5, alkaline phosphatase 72, AST 13 and ALT 8. The immunoglobulins were normal. We will review this at your follow-up and see how you are doing.  Leila Ramey PA-C Dear Florence Mayorga,   The recent labs were sent to me.  The bilirubin 0.7, alkaline phosphatase 76, AST 14, ALT 9.  The immunoglobulin G and IgM were normal.  The IgA is slightly elevated at 369 and this is nonspecific and I would just recommend sharing that with your primary care.  We will review this at the follow-up.  Jeannie Ramey PA-C  she is taking usodiol and labs better she did not do the fibroscan bc it cost too much US was dont and no lesions 8/30/24   The 8/23/24 labs were sent to me. The white blood cells 5.4, red blood cells 5.1, hemoglobin 15.9, MCV 94 and platelets 284. The hemoglobin slightly elevated at 15.9. Upper limits of normal at 15.5 send is only slightly and I would just recommend sharing that with her primary care. Creatinine 0.79, sodium 139, potassium 4.8, bilirubin 0.5, alkaline phosphatase 74, AST 15 and ALT 11. Immunoglobulin G is elevated at 1619 and IgM is 144. This is lower compared to previously the immunoglobulin G was 1739. Happy to see this is lower. The IgM remains normal.   The 8/9/24 labs were sent to me.  The IgM 151, immunoglobulin G 1739.  The immunoglobulin G slightly elevated.  aFP normal at 5.4, you have immunity to hepatitis B.  The bilirubin normal at 0.8, alkaline phosphatase 74, AST 14 and ALT 11.  The white blood cell 6.3, hemoglobin 15.6, MCV 93 and platelets 262.  INR 1.1.  You have immunity to hepatitis A.  May 2 were sent to me and at that time bilirubin 4.2, alkaline phosphatase 233,  and . I am happy to see the labs are lower and given this lets see what your ultrasound shows and then discuss best next steps for you.   discussed the above and can try ursodiol  has scan on the 11th .    ness Weston had a CT scan in April suggesting cirrhosis and she presented with some jaundice at her first visit.  She had elevated liver enzymes in April 24 labs with bilirubin 7.1, alkaline phosphatase 351, AST 1294 and .  She had a history of drug and etohbut none since 2004.  She had a positive BONNIE and ASMA.  Her most recent labs on May 2 were sent to me and at that time bilirubin 4.2, alkaline phosphatase 233,  and .  She had a liver biopsy done and it showed chronic hepatitis with moderate portal lymph of plasmacytic infiltrate and mild interface hepatitis (grade 2) and moderate periportal fibrosis (stage II).  The pathologist comments are overall findings are nonspecific and that the lymphoplasmacytic and and portal infiltrate may be consistent with autoimmune hepatitis in the proper clinical context and recommended correlating with labs.  This biopsy was done at [facility].  Asked about triggers and unclear. Denied any med changes/illness/vitamins/supplements.   Dr. Hans Morocho is her PCP and did her labs regularly and they are normal.   ETOH quit in 2004, was not a heavy drinker.   Denies fh of autoimmune disease, liver disease.

## 2025-01-07 ENCOUNTER — LAB OUTSIDE AN ENCOUNTER (OUTPATIENT)
Dept: URBAN - METROPOLITAN AREA TELEHEALTH 2 | Facility: TELEHEALTH | Age: 70
End: 2025-01-07

## 2025-01-07 ENCOUNTER — OFFICE VISIT (OUTPATIENT)
Dept: URBAN - METROPOLITAN AREA TELEHEALTH 2 | Facility: TELEHEALTH | Age: 70
End: 2025-01-07
Payer: MEDICARE

## 2025-01-07 VITALS — HEIGHT: 67 IN | WEIGHT: 223 LBS | BODY MASS INDEX: 35 KG/M2

## 2025-01-07 DIAGNOSIS — R74.8 ELEVATED LIVER ENZYMES: ICD-10-CM

## 2025-01-07 DIAGNOSIS — R77.2 ELEVATED AFP: ICD-10-CM

## 2025-01-07 DIAGNOSIS — R76.9 ABNORMAL IMMUNOLOGICAL FINDING IN SERUM: ICD-10-CM

## 2025-01-07 DIAGNOSIS — Z98.890 HISTORY OF LIVER BIOPSY: ICD-10-CM

## 2025-01-07 DIAGNOSIS — K75.4 AUTOIMMUNE HEPATITIS: ICD-10-CM

## 2025-01-07 PROCEDURE — 99214 OFFICE O/P EST MOD 30 MIN: CPT | Performed by: PHYSICIAN ASSISTANT

## 2025-01-07 RX ORDER — URSODIOL 300 MG/1
1 CAPSULE CAPSULE ORAL TWICE A DAY
Qty: 60 CAPSULE | Refills: 2 | Status: ACTIVE | COMMUNITY

## 2025-01-07 RX ORDER — MONTELUKAST 10 MG/1
TAKE 1 TABLET BY MOUTH ONCE EVERY DAY TABLET, FILM COATED ORAL
Qty: 30 EACH | Refills: 1 | Status: ACTIVE | COMMUNITY

## 2025-01-07 RX ORDER — URSODIOL 300 MG/1
1 CAPSULE CAPSULE ORAL TWICE A DAY
Qty: 60 CAPSULE | Refills: 2 | OUTPATIENT

## 2025-01-07 RX ORDER — ALBUTEROL SULFATE 90 UG/1
AEROSOL, METERED RESPIRATORY (INHALATION)
Qty: 18 GRAM | Status: ACTIVE | COMMUNITY

## 2025-01-07 RX ORDER — HYDROCODONE BITARTRATE AND ACETAMINOPHEN 10; 325 MG/1; MG/1
TAKE 1 TABLET BY ORAL ROUTE EVERY 4-6 HOURS AS NEEDED FOR PAIN TABLET ORAL
Qty: 0 | Refills: 0 | COMMUNITY
Start: 1900-01-01

## 2025-01-07 RX ORDER — LEVOCETIRIZINE DIHYDROCHLORIDE 5 MG/1
TAKE 1 TABLET BY MOUTH ONCE EVERY DAY TABLET ORAL
Qty: 30 EACH | Refills: 0 | Status: ACTIVE | COMMUNITY

## 2025-01-07 RX ORDER — METOPROLOL TARTRATE 25 MG/1
TABLET, FILM COATED ORAL
Qty: 0 | Refills: 0 | Status: ACTIVE | COMMUNITY
Start: 1900-01-01

## 2025-01-07 RX ORDER — ATORVASTATIN CALCIUM 40 MG/1
TABLET, FILM COATED ORAL
Qty: 0 | Refills: 0 | Status: ACTIVE | COMMUNITY
Start: 1900-01-01

## 2025-01-07 RX ORDER — PANTOPRAZOLE SODIUM 40 MG/1
1 TABLET TABLET, DELAYED RELEASE ORAL ONCE A DAY
Qty: 90 TABLET | Refills: 3 | Status: ACTIVE | COMMUNITY

## 2025-01-07 NOTE — HPI-TODAY'S VISIT:
This is a 68 yo female , referred by VONDA Dodge, for evaluation of possible AIH. A copy of the note will be sent to the referring provider.   1/7/25 She has appt for labs in 2 weeks  need to ge tUS She started on statin yesterday. will need to keep close eye on this.  still on ursodiol and would continue 10/29/24 Florence Mayorga,  The recent labs were sent to me and the bilirubin 0.5, alkaline phosphatase 72, AST 13 and ALT 8. The immunoglobulins were normal. We will review this at your follow-up and see how you are doing.  Leila Ramey PA-C Dear Florence Mayorga,   The recent labs were sent to me.  The bilirubin 0.7, alkaline phosphatase 76, AST 14, ALT 9.  The immunoglobulin G and IgM were normal.  The IgA is slightly elevated at 369 and this is nonspecific and I would just recommend sharing that with your primary care.  We will review this at the follow-up.  Jeannie Ramey PA-C  she is taking usodiol and labs better she did not do the fibroscan bc it cost too much US was dont and no lesions 8/30/24   The 8/23/24 labs were sent to me. The white blood cells 5.4, red blood cells 5.1, hemoglobin 15.9, MCV 94 and platelets 284. The hemoglobin slightly elevated at 15.9. Upper limits of normal at 15.5 send is only slightly and I would just recommend sharing that with her primary care. Creatinine 0.79, sodium 139, potassium 4.8, bilirubin 0.5, alkaline phosphatase 74, AST 15 and ALT 11. Immunoglobulin G is elevated at 1619 and IgM is 144. This is lower compared to previously the immunoglobulin G was 1739. Happy to see this is lower. The IgM remains normal.   The 8/9/24 labs were sent to me.  The IgM 151, immunoglobulin G 1739.  The immunoglobulin G slightly elevated.  aFP normal at 5.4, you have immunity to hepatitis B.  The bilirubin normal at 0.8, alkaline phosphatase 74, AST 14 and ALT 11.  The white blood cell 6.3, hemoglobin 15.6, MCV 93 and platelets 262.  INR 1.1.  You have immunity to hepatitis A.  May 2 were sent to me and at that time bilirubin 4.2, alkaline phosphatase 233,  and . I am happy to see the labs are lower and given this lets see what your ultrasound shows and then discuss best next steps for you.   discussed the above and can try ursodiol  has scan on the 11th .    ness Weston had a CT scan in April suggesting cirrhosis and she presented with some jaundice at her first visit.  She had elevated liver enzymes in April 24 labs with bilirubin 7.1, alkaline phosphatase 351, AST 1294 and .  She had a history of drug and etohbut none since 2004.  She had a positive BONNIE and ASMA.  Her most recent labs on May 2 were sent to me and at that time bilirubin 4.2, alkaline phosphatase 233,  and .  She had a liver biopsy done and it showed chronic hepatitis with moderate portal lymph of plasmacytic infiltrate and mild interface hepatitis (grade 2) and moderate periportal fibrosis (stage II).  The pathologist comments are overall findings are nonspecific and that the lymphoplasmacytic and and portal infiltrate may be consistent with autoimmune hepatitis in the proper clinical context and recommended correlating with labs.  This biopsy was done at [facility].  Asked about triggers and unclear. Denied any med changes/illness/vitamins/supplements.   Dr. Hans Morocho is her PCP and did her labs regularly and they are normal.   ETOH quit in 2004, was not a heavy drinker.   Denies fh of autoimmune disease, liver disease.

## 2025-01-09 ENCOUNTER — TELEPHONE ENCOUNTER (OUTPATIENT)
Dept: URBAN - METROPOLITAN AREA CLINIC 86 | Facility: CLINIC | Age: 70
End: 2025-01-09

## 2025-01-09 NOTE — HPI-TODAY'S VISIT:
Florence Mayorga,  Recent ultrasound was sent to me.  Happy you are able to get it so quickly.  Exam date was January 8, 2025.  The liver had some lobulation and its contour and heterogeneous in its echotexture.  Common bile duct and the gallbladder were normal.  Right kidney appeared normal.  Left kidney normal.  Spleen about 9.2 cm.  They noted that you had a proximal abdominal aortic aneurysm with a slight increase in size since the prior exam that is about 3.3 x 3.7 x 3.3 cm and recommend sharing that with your primary care.  They thought that there was evidence of chronic liver disease and recommended that we correlate this clinically.  If you recall you had a biopsy prior that did not show this.  At some point we may want to consider repeating an MRI.  We will see what the upcoming labs look like. Jeannie Ramey PA-C

## 2025-01-20 ENCOUNTER — LAB OUTSIDE AN ENCOUNTER (OUTPATIENT)
Dept: URBAN - METROPOLITAN AREA TELEHEALTH 2 | Facility: TELEHEALTH | Age: 70
End: 2025-01-20

## 2025-01-21 LAB
A/G RATIO: 1.3
ABSOLUTE BASOPHILS: 37
ABSOLUTE EOSINOPHILS: 180
ABSOLUTE LYMPHOCYTES: 3187
ABSOLUTE MONOCYTES: 484
ABSOLUTE NEUTROPHILS: 2313
AFP, SERUM, TUMOR MARKER: 5.4
ALBUMIN: 4
ALKALINE PHOSPHATASE: 73
ALT (SGPT): 8
AST (SGOT): 13
BASOPHILS: 0.6
BILIRUBIN, TOTAL: 0.9
BUN/CREATININE RATIO: (no result)
BUN: 11
CALCIUM: 9.3
CARBON DIOXIDE, TOTAL: 28
CHLORIDE: 102
CREATININE: 0.82
EGFR: 78
EOSINOPHILS: 2.9
GLOBULIN, TOTAL: 3
GLUCOSE: 98
HEMATOCRIT: 43.1
HEMOGLOBIN: 14.6
INR: 1.1
LYMPHOCYTES: 51.4
MCH: 30.9
MCHC: 33.9
MCV: 91.1
MONOCYTES: 7.8
MPV: 10.3
NEUTROPHILS: 37.3
PLATELET COUNT: 275
POTASSIUM: 4.5
PROTEIN, TOTAL: 7
PT: 11.2
RDW: 12
RED BLOOD CELL COUNT: 4.73
SODIUM: 139
WHITE BLOOD CELL COUNT: 6.2

## 2025-01-22 ENCOUNTER — TELEPHONE ENCOUNTER (OUTPATIENT)
Dept: URBAN - METROPOLITAN AREA CLINIC 86 | Facility: CLINIC | Age: 70
End: 2025-01-22

## 2025-01-22 NOTE — HPI-TODAY'S VISIT:
Florence Mayorga, The recent labs were sent to me.  The alpha-fetoprotein testing was normal at 5.4, happy to see this.  The white blood cell 6.2, hemoglobin 14.6, MCV 43 and platelets 275 and this is normal.  Happy to see this.  Creatinine 0.82, sodium 139 and potassium 4.5.  These are normal.  Bilirubin 0.9, alkaline phosphatase 73, AST 13 and ALT was listed as 8.  These are also normal.  Prior and August the alkaline phosphatase was 74 AST 15 and ALT 11 so even lower now.  We will continue to monitor.  INR 1.1 and this is stable for you.THanks for doing the labs, I hope you are doing well.  Jeannie Ramey PA-C

## 2025-03-07 ENCOUNTER — LAB OUTSIDE AN ENCOUNTER (OUTPATIENT)
Dept: URBAN - METROPOLITAN AREA TELEHEALTH 2 | Facility: TELEHEALTH | Age: 70
End: 2025-03-07

## 2025-03-19 LAB
ALBUMIN/GLOBULIN RATIO: 1.3
ALBUMIN: 4.2
ALKALINE PHOSPHATASE: 68
ALT (SGPT): 8
AST (SGOT): 14
BILIRUBIN, DIRECT: 0.1
BILIRUBIN, INDIRECT: 0.6
BILIRUBIN, TOTAL: 0.7
GLOBULIN: 3.2
PROTEIN, TOTAL: 7.4

## 2025-03-21 ENCOUNTER — TELEPHONE ENCOUNTER (OUTPATIENT)
Dept: URBAN - METROPOLITAN AREA CLINIC 86 | Facility: CLINIC | Age: 70
End: 2025-03-21

## 2025-03-21 NOTE — HPI-TODAY'S VISIT:
Ms. Mayorga,  The liver enzymes are normal. The bilirubin 0.7, alkaline [id number], ast 14 and alt 8.  Jeannie Ramey PA-C
Atrial fibrillation

## 2025-04-01 ENCOUNTER — LAB OUTSIDE AN ENCOUNTER (OUTPATIENT)
Dept: URBAN - METROPOLITAN AREA TELEHEALTH 2 | Facility: TELEHEALTH | Age: 70
End: 2025-04-01

## 2025-04-02 LAB
A/G RATIO: 1.2
ABSOLUTE BASOPHILS: 41
ABSOLUTE EOSINOPHILS: 151
ABSOLUTE LYMPHOCYTES: 2604
ABSOLUTE MONOCYTES: 505
ABSOLUTE NEUTROPHILS: 2500
AFP, SERUM, TUMOR MARKER: 6.6
ALBUMIN: 4.1
ALKALINE PHOSPHATASE: 74
ALT (SGPT): 7
AST (SGOT): 13
BASOPHILS: 0.7
BILIRUBIN, TOTAL: 0.5
BUN/CREATININE RATIO: (no result)
BUN: 14
CALCIUM: 9.4
CARBON DIOXIDE, TOTAL: 26
CHLORIDE: 104
CREATININE: 0.85
EGFR: 75
EOSINOPHILS: 2.6
GLOBULIN, TOTAL: 3.4
GLUCOSE: 89
HEMATOCRIT: 46
HEMOGLOBIN: 15.5
IMMUNOGLOBULIN G, QN, SERUM: 1345
IMMUNOGLOBULIN M: 156
LYMPHOCYTES: 44.9
MCH: 31.3
MCHC: 33.7
MCV: 92.9
MONOCYTES: 8.7
MPV: 10.5
NEUTROPHILS: 43.1
PLATELET COUNT: 300
POTASSIUM: 4.8
PROTEIN, TOTAL: 7.5
RDW: 12.9
RED BLOOD CELL COUNT: 4.95
SODIUM: 139
WHITE BLOOD CELL COUNT: 5.8

## 2025-04-03 ENCOUNTER — TELEPHONE ENCOUNTER (OUTPATIENT)
Dept: URBAN - METROPOLITAN AREA CLINIC 86 | Facility: CLINIC | Age: 70
End: 2025-04-03

## 2025-04-03 NOTE — HPI-TODAY'S VISIT:
Florence Mayorga, The recent labs were sent to me.  The complete blood count overall normal.  This includes your white blood cells, red blood cells, hemoglobin and platelets.  Platelets were normal at 300.  The creatinine 0.5, sodium 139 potassium 4.8 and this is normal.  Bilirubin 0.5, alkaline phosphatase 74, AST 13 ALT 7.  Happy to see these are still normal.  The alpha-fetoprotein marker is 6.6 and prior 5.4 back.  January and we need to continue to monitor this.  If you recall in January your ultrasound did not show any issues and we will just have to continue to monitor this. Jeannie Ramey PA-C

## 2025-04-11 ENCOUNTER — OFFICE VISIT (OUTPATIENT)
Dept: URBAN - METROPOLITAN AREA TELEHEALTH 2 | Facility: TELEHEALTH | Age: 70
End: 2025-04-11
Payer: MEDICARE

## 2025-04-11 DIAGNOSIS — K75.4 AUTOIMMUNE HEPATITIS: ICD-10-CM

## 2025-04-11 DIAGNOSIS — R76.9 ABNORMAL IMMUNOLOGICAL FINDING IN SERUM: ICD-10-CM

## 2025-04-11 DIAGNOSIS — R74.8 ELEVATED LIVER ENZYMES: ICD-10-CM

## 2025-04-11 DIAGNOSIS — R77.2 ELEVATED AFP: ICD-10-CM

## 2025-04-11 PROCEDURE — 99214 OFFICE O/P EST MOD 30 MIN: CPT | Performed by: PHYSICIAN ASSISTANT

## 2025-04-11 RX ORDER — URSODIOL 300 MG/1
1 CAPSULE CAPSULE ORAL TWICE A DAY
Qty: 60 CAPSULE | Refills: 2 | Status: ACTIVE | COMMUNITY

## 2025-04-11 RX ORDER — METOPROLOL TARTRATE 25 MG/1
TABLET, FILM COATED ORAL
Qty: 0 | Refills: 0 | Status: ACTIVE | COMMUNITY
Start: 1900-01-01

## 2025-04-11 RX ORDER — MONTELUKAST 10 MG/1
TAKE 1 TABLET BY MOUTH ONCE EVERY DAY TABLET, FILM COATED ORAL
Qty: 30 EACH | Refills: 1 | Status: ACTIVE | COMMUNITY

## 2025-04-11 RX ORDER — URSODIOL 300 MG/1
1 CAPSULE CAPSULE ORAL TWICE A DAY
Qty: 60 CAPSULE | Refills: 2 | OUTPATIENT
Start: 2025-04-11

## 2025-04-11 RX ORDER — ALBUTEROL SULFATE 90 UG/1
AEROSOL, METERED RESPIRATORY (INHALATION)
Qty: 18 GRAM | Status: ACTIVE | COMMUNITY

## 2025-04-11 RX ORDER — HYDROCODONE BITARTRATE AND ACETAMINOPHEN 10; 325 MG/1; MG/1
TAKE 1 TABLET BY ORAL ROUTE EVERY 4-6 HOURS AS NEEDED FOR PAIN TABLET ORAL
Qty: 0 | Refills: 0 | COMMUNITY
Start: 1900-01-01

## 2025-04-11 RX ORDER — PANTOPRAZOLE SODIUM 40 MG/1
1 TABLET TABLET, DELAYED RELEASE ORAL ONCE A DAY
Qty: 90 TABLET | Refills: 3 | Status: ACTIVE | COMMUNITY

## 2025-04-11 RX ORDER — LEVOCETIRIZINE DIHYDROCHLORIDE 5 MG/1
TAKE 1 TABLET BY MOUTH ONCE EVERY DAY TABLET ORAL
Qty: 30 EACH | Refills: 0 | Status: ACTIVE | COMMUNITY

## 2025-04-11 NOTE — PHYSICAL EXAM GASTROINTESTINAL
Self Exam: Abdomen soft, non-tender to palpatation, non-distended Cantharidin Pregnancy And Lactation Text: This medication has not been proven safe during pregnancy. It is unknown if this medication is excreted in breast milk.

## 2025-04-11 NOTE — HPI-TODAY'S VISIT:
This is a 70 yo female , referred by VONDA Dodge, for evaluation of possible AIH. A copy of the note will be sent to the referring provider.   4/11/25 Labs have been staying stable  Ms. Mayorga,  The liver enzymes are normal. The bilirubin 0.7, alkaline [id number], ast 14 and alt 8.  IZABELA Cali,  The recent labs were sent to me. The alpha-fetoprotein testing was normal at 5.4, happy to see this. The white blood cell 6.2, hemoglobin 14.6, MCV 43 and platelets 275 and this is normal. Happy to see this. Creatinine 0.82, sodium 139 and potassium 4.5. These are normal. Bilirubin 0.9, alkaline phosphatase 73, AST 13 and ALT was listed as 8. These are also normal. Prior and August the alkaline phosphatase was 74 AST 15 and ALT 11 so even lower now. We will continue to monitor. INR 1.1 and this is stable for you.THanks for doing the labs, I hope you are doing well.  IZABELA Cali,  Recent ultrasound was sent to me. Happy you are able to get it so quickly. Exam date was January 8, 2025. The liver had some lobulation and its contour and heterogeneous in its echotexture. Common bile duct and the gallbladder were normal. Right kidney appeared normal. Left kidney normal. Spleen about 9.2 cm. They noted that you had a proximal abdominal aortic aneurysm with a slight increase in size since the prior exam that is about 3.3 x 3.7 x 3.3 cm and recommend sharing that with your primary care. They thought that there was evidence of chronic liver disease and recommended that we correlate this clinically. If you recall you had a biopsy prior that did not show this. At some point we may want to consider repeating an MRI. We will see what the upcoming labs look like.  IZABELA Cali, The recent labs were sent to me. The complete blood count overall normal. This includes your white blood cells, red blood cells, hemoglobin and platelets. Platelets were normal at 300. The creatinine 0.5, sodium 139 potassium 4.8 and this is normal. Bilirubin 0.5, alkaline phosphatase 74, AST 13 ALT 7. Happy to see these are still normal. The alpha-fetoprotein marker is 6.6 and prior 5.4 back. January and we need to continue to monitor this. If you recall in January your ultrasound did not show any issues and we will just have to continue to monitor this. Jeannie Ramey PA-C  she is off cholesterol med  recpa 1/7/25 She has appt for labs in 2 weeks  need to ge tUS She started on statin yesterday. will need to keep close eye on this.  still on ursodiol and would continue 10/29/24 Florence Mayorga,  The recent labs were sent to me and the bilirubin 0.5, alkaline phosphatase 72, AST 13 and ALT 8. The immunoglobulins were normal. We will review this at your follow-up and see how you are doing.  Leila Ramey PA-C Dear Florence Mayorga,   The recent labs were sent to me.  The bilirubin 0.7, alkaline phosphatase 76, AST 14, ALT 9.  The immunoglobulin G and IgM were normal.  The IgA is slightly elevated at 369 and this is nonspecific and I would just recommend sharing that with your primary care.  We will review this at the follow-up.  Jeannie Ramey PA-C  she is taking usodiol and labs better she did not do the fibroscan bc it cost too much US was dont and no lesions 8/30/24   The 8/23/24 labs were sent to me. The white blood cells 5.4, red blood cells 5.1, hemoglobin 15.9, MCV 94 and platelets 284. The hemoglobin slightly elevated at 15.9. Upper limits of normal at 15.5 send is only slightly and I would just recommend sharing that with her primary care. Creatinine 0.79, sodium 139, potassium 4.8, bilirubin 0.5, alkaline phosphatase 74, AST 15 and ALT 11. Immunoglobulin G is elevated at 1619 and IgM is 144. This is lower compared to previously the immunoglobulin G was 1739. Happy to see this is lower. The IgM remains normal.   The 8/9/24 labs were sent to me.  The IgM 151, immunoglobulin G 1739.  The immunoglobulin G slightly elevated.  aFP normal at 5.4, you have immunity to hepatitis B.  The bilirubin normal at 0.8, alkaline phosphatase 74, AST 14 and ALT 11.  The white blood cell 6.3, hemoglobin 15.6, MCV 93 and platelets 262.  INR 1.1.  You have immunity to hepatitis A.  May 2 were sent to me and at that time bilirubin 4.2, alkaline phosphatase 233,  and . I am happy to see the labs are lower and given this lets see what your ultrasound shows and then discuss best next steps for you.   discussed the above and can try ursodiol  has scan on the 11th .    ness Weston had a CT scan in April suggesting cirrhosis and she presented with some jaundice at her first visit.  She had elevated liver enzymes in April 24 labs with bilirubin 7.1, alkaline phosphatase 351, AST 1294 and .  She had a history of drug and etohbut none since 2004.  She had a positive BONNIE and ASMA.  Her most recent labs on May 2 were sent to me and at that time bilirubin 4.2, alkaline phosphatase 233,  and .  She had a liver biopsy done and it showed chronic hepatitis with moderate portal lymph of plasmacytic infiltrate and mild interface hepatitis (grade 2) and moderate periportal fibrosis (stage II).  The pathologist comments are overall findings are nonspecific and that the lymphoplasmacytic and and portal infiltrate may be consistent with autoimmune hepatitis in the proper clinical context and recommended correlating with labs.  This biopsy was done at [facility].  Asked about triggers and unclear. Denied any med changes/illness/vitamins/supplements.   Dr. Hans Morocho is her PCP and did her labs regularly and they are normal.   ETOH quit in 2004, was not a heavy drinker.   Denies fh of autoimmune disease, liver disease.

## 2025-04-24 ENCOUNTER — TELEPHONE ENCOUNTER (OUTPATIENT)
Dept: URBAN - METROPOLITAN AREA CLINIC 86 | Facility: CLINIC | Age: 70
End: 2025-04-24

## 2025-04-24 NOTE — HPI-TODAY'S VISIT:
Florence Mayorga,   The recent ultrasound was sent to us.  The Doppler portion said that everything was getting good blood flow.  Spleen and pancreas appeared normal.  They did not see any masses on the liver.  They did not see any gallstones.  They noted that the liver appeared somewhat heterogeneous and this is stable for you.  We will continue to monitor as discussed.  Jeannie Ramey PA-C

## 2025-05-01 ENCOUNTER — LAB OUTSIDE AN ENCOUNTER (OUTPATIENT)
Dept: URBAN - METROPOLITAN AREA TELEHEALTH 2 | Facility: TELEHEALTH | Age: 70
End: 2025-05-01

## 2025-05-28 ENCOUNTER — TELEPHONE ENCOUNTER (OUTPATIENT)
Dept: URBAN - METROPOLITAN AREA CLINIC 18 | Facility: CLINIC | Age: 70
End: 2025-05-28

## 2025-05-28 RX ORDER — URSODIOL 300 MG/1
1 CAPSULE CAPSULE ORAL TWICE A DAY
Qty: 60 CAPSULE | Refills: 2
Start: 2025-04-11

## 2025-07-01 ENCOUNTER — LAB OUTSIDE AN ENCOUNTER (OUTPATIENT)
Dept: URBAN - METROPOLITAN AREA TELEHEALTH 2 | Facility: TELEHEALTH | Age: 70
End: 2025-07-01

## 2025-07-11 ENCOUNTER — LAB OUTSIDE AN ENCOUNTER (OUTPATIENT)
Dept: URBAN - METROPOLITAN AREA CLINIC 92 | Facility: CLINIC | Age: 70
End: 2025-07-11

## 2025-07-14 LAB
A/G RATIO: 1.3
ABSOLUTE BASOPHILS: 20
ABSOLUTE EOSINOPHILS: 152
ABSOLUTE LYMPHOCYTES: 1931
ABSOLUTE MONOCYTES: 382
ABSOLUTE NEUTROPHILS: 2416
AFP, SERUM, TUMOR MARKER: 6.4
ALBUMIN: 4.1
ALKALINE PHOSPHATASE: 71
ALT (SGPT): 8
AST (SGOT): 12
BASOPHILS: 0.4
BILIRUBIN, TOTAL: 0.5
BUN/CREATININE RATIO: (no result)
BUN: 8
CALCIUM: 9.6
CARBON DIOXIDE, TOTAL: 27
CHLORIDE: 104
CREATININE: 0.8
EGFR: 80
EOSINOPHILS: 3.1
GLOBULIN, TOTAL: 3.1
GLUCOSE: 95
HEMATOCRIT: 46
HEMOGLOBIN: 14.8
LYMPHOCYTES: 39.4
MCH: 30.8
MCHC: 32.2
MCV: 95.6
MONOCYTES: 7.8
MPV: 10.5
NEUTROPHILS: 49.3
PLATELET COUNT: 290
POTASSIUM: 4.5
PROTEIN, TOTAL: 7.2
RDW: 14
RED BLOOD CELL COUNT: 4.81
SODIUM: 139
WHITE BLOOD CELL COUNT: 4.9

## 2025-07-22 ENCOUNTER — TELEPHONE ENCOUNTER (OUTPATIENT)
Dept: URBAN - METROPOLITAN AREA CLINIC 86 | Facility: CLINIC | Age: 70
End: 2025-07-22

## 2025-07-22 NOTE — HPI-TODAY'S VISIT:
Florence Mayorga, The recent labs are sent to us.  The alpha-fetoprotein was 6.4 and prior it was 6.6 or lower.  We will continue to monitor this.  The creatinine is normal this is her kidney function.  Bilirubin 0.5, alkaline phosphatase 71, AST 12 and ALT 8.  Goal for the AST and ALT is less than 25.  The complete blood count overall is normal.  Platelets 290 and this is also normal.   Jeannie Ramey PA-C

## 2025-07-24 ENCOUNTER — LAB OUTSIDE AN ENCOUNTER (OUTPATIENT)
Dept: URBAN - METROPOLITAN AREA TELEHEALTH 2 | Facility: TELEHEALTH | Age: 70
End: 2025-07-24

## 2025-07-24 ENCOUNTER — OFFICE VISIT (OUTPATIENT)
Dept: URBAN - METROPOLITAN AREA TELEHEALTH 2 | Facility: TELEHEALTH | Age: 70
End: 2025-07-24
Payer: MEDICARE

## 2025-07-24 DIAGNOSIS — Z98.890 HISTORY OF LIVER BIOPSY: ICD-10-CM

## 2025-07-24 DIAGNOSIS — R77.2 ELEVATED AFP: ICD-10-CM

## 2025-07-24 DIAGNOSIS — R76.9 ABNORMAL IMMUNOLOGICAL FINDING IN SERUM: ICD-10-CM

## 2025-07-24 DIAGNOSIS — K75.4 AUTOIMMUNE HEPATITIS: ICD-10-CM

## 2025-07-24 DIAGNOSIS — R74.8 ELEVATED LIVER ENZYMES: ICD-10-CM

## 2025-07-24 PROCEDURE — 99214 OFFICE O/P EST MOD 30 MIN: CPT | Performed by: PHYSICIAN ASSISTANT

## 2025-07-24 RX ORDER — ASCORBIC ACID 1000 MG
1 CAPSULE TABLET ORAL ONCE A DAY
Status: ACTIVE | COMMUNITY

## 2025-07-24 RX ORDER — HYDROCODONE BITARTRATE AND ACETAMINOPHEN 10; 325 MG/1; MG/1
TAKE 1 TABLET BY ORAL ROUTE EVERY 4-6 HOURS AS NEEDED FOR PAIN TABLET ORAL
Qty: 0 | Refills: 0 | Status: ACTIVE | COMMUNITY
Start: 1900-01-01

## 2025-07-24 RX ORDER — METOPROLOL TARTRATE 25 MG/1
TABLET, FILM COATED ORAL
Qty: 0 | Refills: 0 | Status: ACTIVE | COMMUNITY
Start: 1900-01-01

## 2025-07-24 RX ORDER — LEVOCETIRIZINE DIHYDROCHLORIDE 5 MG/1
TAKE 1 TABLET BY MOUTH ONCE EVERY DAY TABLET ORAL
Qty: 30 EACH | Refills: 0 | Status: ON HOLD | COMMUNITY

## 2025-07-24 RX ORDER — URSODIOL 300 MG/1
1 CAPSULE CAPSULE ORAL TWICE A DAY
Qty: 60 CAPSULE | Refills: 2 | OUTPATIENT
Start: 2025-07-24

## 2025-07-24 RX ORDER — PANTOPRAZOLE SODIUM 40 MG/1
1 TABLET TABLET, DELAYED RELEASE ORAL ONCE A DAY
Qty: 90 TABLET | Refills: 3 | Status: ACTIVE | COMMUNITY

## 2025-07-24 RX ORDER — ALBUTEROL SULFATE 90 UG/1
AEROSOL, METERED RESPIRATORY (INHALATION)
Qty: 18 GRAM | Status: ACTIVE | COMMUNITY

## 2025-07-24 RX ORDER — URSODIOL 300 MG/1
1 CAPSULE CAPSULE ORAL TWICE A DAY
Qty: 60 CAPSULE | Refills: 2 | Status: ACTIVE | COMMUNITY
Start: 2025-04-11

## 2025-07-24 RX ORDER — MONTELUKAST 10 MG/1
TAKE 1 TABLET BY MOUTH ONCE EVERY DAY TABLET, FILM COATED ORAL
Qty: 30 EACH | Refills: 1 | Status: ACTIVE | COMMUNITY

## 2025-07-24 NOTE — HPI-TODAY'S VISIT:
This is a 68 yo female , referred by VONDA Dodge, for evaluation of possible AIH. A copy of the note will be sent to the referring provider.   7/24/25 The alpha-fetoprotein was 6.4 and prior it was 6.6 or lower. We will continue to monitor this. The creatinine is normal this is her kidney function. Bilirubin 0.5, alkaline phosphatase 71, AST 12 and ALT 8. Goal for the AST and ALT is less than 25. The complete blood count overall is normal. Platelets 290 and this is also normal. Florencejose angel Mayorga,   The recent ultrasound was sent to us. The Doppler portion said that everything was getting good blood flow. Spleen and pancreas appeared normal. They did not see any masses on the liver. They did not see any gallstones. They noted that the liver appeared somewhat heterogeneous and this is stable for you. We will continue to monitor as discussed.  IZABELA Cali,   The recent ultrasound was sent to us. The Doppler portion said that everything was getting good blood flow. Spleen and pancreas appeared normal. They did not see any masses on the liver. They did not see any gallstones. They noted that the liver appeared somewhat heterogeneous and this is stable for you. We will continue to monitor as discussed.  Jeannie Ramey PA-C   recalena 4/11/25 Labs have been staying stable  Ms. Mayorga,  The liver enzymes are normal. The bilirubin 0.7, alkaline [id number], ast 14 and alt 8.  IZABELA Cali,  The recent labs were sent to me. The alpha-fetoprotein testing was normal at 5.4, happy to see this. The white blood cell 6.2, hemoglobin 14.6, MCV 43 and platelets 275 and this is normal. Happy to see this. Creatinine 0.82, sodium 139 and potassium 4.5. These are normal. Bilirubin 0.9, alkaline phosphatase 73, AST 13 and ALT was listed as 8. These are also normal. Prior and August the alkaline phosphatase was 74 AST 15 and ALT 11 so even lower now. We will continue to monitor. INR 1.1 and this is stable for you.THanks for doing the labs, I hope you are doing well.  IZABELA Cali,  Recent ultrasound was sent to me. Happy you are able to get it so quickly. Exam date was January 8, 2025. The liver had some lobulation and its contour and heterogeneous in its echotexture. Common bile duct and the gallbladder were normal. Right kidney appeared normal. Left kidney normal. Spleen about 9.2 cm. They noted that you had a proximal abdominal aortic aneurysm with a slight increase in size since the prior exam that is about 3.3 x 3.7 x 3.3 cm and recommend sharing that with your primary care. They thought that there was evidence of chronic liver disease and recommended that we correlate this clinically. If you recall you had a biopsy prior that did not show this. At some point we may want to consider repeating an MRI. We will see what the upcoming labs look like.  IZABELA Cali, The recent labs were sent to me. The complete blood count overall normal. This includes your white blood cells, red blood cells, hemoglobin and platelets. Platelets were normal at 300. The creatinine 0.5, sodium 139 potassium 4.8 and this is normal. Bilirubin 0.5, alkaline phosphatase 74, AST 13 ALT 7. Happy to see these are still normal. The alpha-fetoprotein marker is 6.6 and prior 5.4 back. January and we need to continue to monitor this. If you recall in January your ultrasound did not show any issues and we will just have to continue to monitor this. Jeannie Ramey PA-C  she is off cholesterol med  recpa 1/7/25 She has appt for labs in 2 weeks  [facility] started on statin yesterday. will need to keep close eye on this.  still on ursodiol and would continue 10/29/24 Florence Mayorga,  The recent labs were sent to me and the bilirubin 0.5, alkaline phosphatase 72, AST 13 and ALT 8. The immunoglobulins were normal. We will review this at your follow-up and see how you are doing.  Leila Ramey PA-C Dear Florence Mayorga,   The recent labs were sent to me.  The bilirubin 0.7, alkaline phosphatase 76, AST 14, ALT 9.  The immunoglobulin G and IgM were normal.  The IgA is slightly elevated at 369 and this is nonspecific and I would just recommend sharing that with your primary care.  We will review this at the follow-up.  Jeannie Ramey PA-C  she is taking usodiol and labs better she did not do the fibroscan bc it cost too much US was dont and no lesions 8/30/24   The 8/23/24 labs were sent to me. The white blood cells 5.4, red blood cells 5.1, hemoglobin 15.9, MCV 94 and platelets 284. The hemoglobin slightly elevated at 15.9. Upper limits of normal at 15.5 send is only slightly and I would just recommend sharing that with her primary care. Creatinine 0.79, sodium 139, potassium 4.8, bilirubin 0.5, alkaline phosphatase 74, AST 15 and ALT 11. Immunoglobulin G is elevated at 1619 and IgM is 144. This is lower compared to previously the immunoglobulin G was 1739. Happy to see this is lower. The IgM remains normal.   The 8/9/24 labs were sent to me.  The IgM 151, immunoglobulin G 1739.  The immunoglobulin G slightly elevated.  aFP normal at 5.4, you have immunity to hepatitis B.  The bilirubin normal at 0.8, alkaline phosphatase 74, AST 14 and ALT 11.  The white blood cell 6.3, hemoglobin 15.6, MCV 93 and platelets 262.  INR 1.1.  You have immunity to hepatitis A.  May 2 were sent to me and at that time bilirubin 4.2, alkaline phosphatase 233,  and . I am happy to see the labs are lower and given this lets see what your ultrasound shows and then discuss best next steps for you.   discussed the above and can try ursodiol  has scan on the 11th .    ness Weston had a CT scan in April suggesting cirrhosis and she presented with some jaundice at her first visit.  She had elevated liver enzymes in April 24 labs with bilirubin 7.1, alkaline phosphatase 351, AST 1294 and .  She had a history of drug and etohbut none since 2004.  She had a positive BONNIE and ASMA.  Her most recent labs on May 2 were sent to me and at that time bilirubin 4.2, alkaline phosphatase 233,  and .  She had a liver biopsy done and it showed chronic hepatitis with moderate portal lymph of plasmacytic infiltrate and mild interface hepatitis (grade 2) and moderate periportal fibrosis (stage II).  The pathologist comments are overall findings are nonspecific and that the lymphoplasmacytic and and portal infiltrate may be consistent with autoimmune hepatitis in the proper clinical context and recommended correlating with labs.  This biopsy was done at [facility].  Asked about triggers and unclear. Denied any med changes/illness/vitamins/supplements.   Dr. Hans Morocho is her PCP and did her labs regularly and they are normal.   ETOH quit in 2004, was not a heavy drinker.   Denies fh of autoimmune disease, liver disease.